# Patient Record
Sex: FEMALE | Race: OTHER | Employment: PART TIME | URBAN - METROPOLITAN AREA
[De-identification: names, ages, dates, MRNs, and addresses within clinical notes are randomized per-mention and may not be internally consistent; named-entity substitution may affect disease eponyms.]

---

## 2019-04-12 ENCOUNTER — HOSPITAL ENCOUNTER (EMERGENCY)
Age: 29
Discharge: HOME | End: 2019-04-12
Attending: EMERGENCY MEDICINE
Payer: SELF-PAY

## 2019-04-12 ENCOUNTER — APPOINTMENT (OUTPATIENT)
Dept: GENERAL RADIOLOGY | Age: 29
End: 2019-04-12
Attending: EMERGENCY MEDICINE
Payer: SELF-PAY

## 2019-04-12 VITALS
OXYGEN SATURATION: 97 % | TEMPERATURE: 98.4 F | HEART RATE: 64 BPM | DIASTOLIC BLOOD PRESSURE: 72 MMHG | SYSTOLIC BLOOD PRESSURE: 118 MMHG | RESPIRATION RATE: 18 BRPM

## 2019-04-12 DIAGNOSIS — M94.0 COSTOCHONDRITIS: Primary | ICD-10-CM

## 2019-04-12 LAB
ANION GAP SERPL CALC-SCNC: 9 MMOL/L
ATRIAL RATE: 65 BPM
BASOPHILS # BLD: 0.1 K/UL (ref 0–0.2)
BASOPHILS NFR BLD: 1 % (ref 0–2)
BUN SERPL-MCNC: 7 MG/DL (ref 6–23)
CALCIUM SERPL-MCNC: 9.2 MG/DL (ref 8.3–10.4)
CALCULATED P AXIS, ECG09: 54 DEGREES
CALCULATED R AXIS, ECG10: 37 DEGREES
CALCULATED T AXIS, ECG11: 17 DEGREES
CHLORIDE SERPL-SCNC: 106 MMOL/L (ref 98–107)
CO2 SERPL-SCNC: 25 MMOL/L (ref 21–32)
CREAT SERPL-MCNC: 0.6 MG/DL (ref 0.6–1)
CRP SERPL-MCNC: 1.9 MG/DL (ref 0–0.9)
D DIMER PPP FEU-MCNC: 0.7 UG/ML(FEU)
DIAGNOSIS, 93000: NORMAL
DIFFERENTIAL METHOD BLD: NORMAL
EOSINOPHIL # BLD: 0.2 K/UL (ref 0–0.8)
EOSINOPHIL NFR BLD: 2 % (ref 0.5–7.8)
ERYTHROCYTE [DISTWIDTH] IN BLOOD BY AUTOMATED COUNT: 13.1 % (ref 11.9–14.6)
GLUCOSE SERPL-MCNC: 84 MG/DL (ref 65–100)
HCG UR QL: NEGATIVE
HCT VFR BLD AUTO: 40 % (ref 35.8–46.3)
HGB BLD-MCNC: 13.2 G/DL (ref 11.7–15.4)
IMM GRANULOCYTES # BLD AUTO: 0 K/UL (ref 0–0.5)
IMM GRANULOCYTES NFR BLD AUTO: 0 % (ref 0–5)
LYMPHOCYTES # BLD: 3.1 K/UL (ref 0.5–4.6)
LYMPHOCYTES NFR BLD: 35 % (ref 13–44)
MCH RBC QN AUTO: 29.8 PG (ref 26.1–32.9)
MCHC RBC AUTO-ENTMCNC: 33 G/DL (ref 31.4–35)
MCV RBC AUTO: 90.3 FL (ref 79.6–97.8)
MONOCYTES # BLD: 0.5 K/UL (ref 0.1–1.3)
MONOCYTES NFR BLD: 6 % (ref 4–12)
NEUTS SEG # BLD: 5 K/UL (ref 1.7–8.2)
NEUTS SEG NFR BLD: 56 % (ref 43–78)
NRBC # BLD: 0 K/UL (ref 0–0.2)
P-R INTERVAL, ECG05: 124 MS
PLATELET # BLD AUTO: 317 K/UL (ref 150–450)
PMV BLD AUTO: 10 FL (ref 9.4–12.3)
POTASSIUM SERPL-SCNC: 4 MMOL/L (ref 3.5–5.1)
Q-T INTERVAL, ECG07: 382 MS
QRS DURATION, ECG06: 76 MS
QTC CALCULATION (BEZET), ECG08: 397 MS
RBC # BLD AUTO: 4.43 M/UL (ref 4.05–5.2)
SODIUM SERPL-SCNC: 140 MMOL/L (ref 136–145)
VENTRICULAR RATE, ECG03: 65 BPM
WBC # BLD AUTO: 9 K/UL (ref 4.3–11.1)

## 2019-04-12 PROCEDURE — 86140 C-REACTIVE PROTEIN: CPT

## 2019-04-12 PROCEDURE — 99284 EMERGENCY DEPT VISIT MOD MDM: CPT | Performed by: EMERGENCY MEDICINE

## 2019-04-12 PROCEDURE — 71046 X-RAY EXAM CHEST 2 VIEWS: CPT

## 2019-04-12 PROCEDURE — 81025 URINE PREGNANCY TEST: CPT

## 2019-04-12 PROCEDURE — 93005 ELECTROCARDIOGRAM TRACING: CPT | Performed by: EMERGENCY MEDICINE

## 2019-04-12 PROCEDURE — 80048 BASIC METABOLIC PNL TOTAL CA: CPT

## 2019-04-12 PROCEDURE — 85379 FIBRIN DEGRADATION QUANT: CPT

## 2019-04-12 PROCEDURE — 85025 COMPLETE CBC W/AUTO DIFF WBC: CPT

## 2019-04-12 RX ORDER — PREDNISONE 10 MG/1
TABLET ORAL
Qty: 21 TAB | Refills: 0 | Status: SHIPPED | OUTPATIENT
Start: 2019-04-12 | End: 2019-09-02 | Stop reason: CLARIF

## 2019-04-12 NOTE — DISCHARGE INSTRUCTIONS
Patient Education        Costocondritis: Jennifer Nunez - [ Costochondritis: Care Instructions ]  Instrucciones de cuidado  Usted tiene dolor en el pecho porque el cartílago de lan caja torácica está inflamado. Yulisa problema se llama costocondritis. Yulisa tipo de dolor de la pared torácica puede durar desde varios días a semanas. No es un problema cardíaco. A veces la costocondritis ocurre con un resfriado o la gripe, y otras veces no se conoce la causa exacta. La atención de seguimiento es kaykay parte clave de lan tratamiento y seguridad. Asegúrese de hacer y acudir a todas las citas, y llame a lan médico si está teniendo problemas. También es kaykay buena idea saber los resultados de seferino exámenes y mantener kaykay lista de los medicamentos que isidoro. ¿Cómo puede cuidarse en el hogar? · [employer] medicamentos para el dolor y la inflamación exactamente wu le fueron indicados. ? Si el médico le recetó un medicamento, tómelo según las indicaciones. ? Si no está tomando un analgésico (medicamento para el dolor) recetado, pregúntele a lan médico si puede domo shruthi de [employer]. ? No tome dos o más analgésicos al [employer], a menos que el médico se lo haya indicado. Muchos analgésicos contienen acetaminofén, es decir, Tylenol. El exceso de acetaminofén (Tylenol) puede ser dañino. · Usar kaykay compresa tibia o kaykay almohadilla térmica (a temperatura baja) sobre el pecho puede ayudar. También puede tratar de [employer] calor y hielo. Colóquese hielo o kaykay compresa fría en la doug kinsey 10 a 20 minutos cada vez. Póngase un paño malone entre el hielo y la piel. · Evite cualquier actividad en la que tenga que esforzar la doug del pecho. A medida que lan dolor mejore, puede volver poco a poco a seferino actividades normales. · No use cinta adhesiva, un vendaje elástico, un \"cinturón para las costillas\", ni ninguna otra cosa que restrinja el movimiento de la pared torácica. ¿Cuándo debe pedir ayuda?   Llame al 911 en cualquier momento que considere que necesita atención de emergencia. Por ejemplo, llame si:    · Siente nuevo o diferente dolor u opresión en el pecho. [place] podría ocurrir junto con:  ? Sudoración. ? Falta de aire. ? Náuseas o vómito. ? Dolor que se extiende del pecho al ro, la [place], o hacia shruthi o ambos hombros o [id number] ? Dylan James. ? Pulso rápido o irregular. Después de llamar al 911, mastique 1 aspirina para adultos. Espere a la ambulancia. No trate de conducir usted mismo un automóvil.     · Tiene serias dificultades para respirar.    Llame a lan médico ahora mismo o busque atención médica inmediata si:    · Tiene fiebre o tos.     · Tiene cualquier dificultad para respirar.     · El dolor en el pecho empeora.    Preste especial atención a los cambios en lan lalita y asegúrese de comunicarse con lan médico si:    · El dolor de pecho continúa aunque esté tomando medicamentos antiinflamatorios.     · El dolor de la pared torácica no de pb después de 5 a 7 días. ¿Dónde puede encontrar más información en inglés? Elbert Ryder a http://efren-isaac.info/. Azul Rabago N049 en la búsqueda para aprender más acerca de \"Costocondritis: Instrucciones de cuidado - [ Costochondritis: Care Instructions ]. \"  Revisado: 23 septiembre, 2018  Versión del contenido: 11.9  © 9314-5984 [employer]. Las instrucciones de cuidado fueron adaptadas bajo licencia por [employer] (which disclaims liability or warranty for this information). Si usted tiene M[place] Huger afección médica o sobre estas instrucciones, siempre pregunte a lan profesional de lalita. [facility]orporated niega toda garantía o responsabilidad por lan uso de esta información.

## 2019-04-12 NOTE — PROGRESS NOTES
present when Dr. Yaquelin Bernal spoke to the patient about her diagnostic. Thank you, Matthew Odell [facility] [address]  Gaurav Cohen 
[phone] [email or web address]

## 2019-04-12 NOTE — ED NOTES
Using phone  patient complaining of shortness of breath and chest pain starting yesterday. Patient advises that shortness of breath is worse when she lays down that she had to keep sitting up. Patient advises no medical history and NKDA. Will move patient from triage to room and start EKG.

## 2019-04-12 NOTE — PROGRESS NOTES
present for nurse assessment and doctor's assessment with Dr. Shannan Yarbrough. Thank you, Hebert Kawasaki [facility] [address]  Gaurav Cohen 
[phone] [email or web address]

## 2019-04-12 NOTE — ED PROVIDER NOTES
Patient presents with complaints of chest pain that began yesterday. Pain is present only with deep inspiration. She also reports shortness of breath that seems to be worse or present only when laying supine. She denies any fever or chills or cough or congestion. There is no radiation of pain which is sternal in location. She has no history of heart or lung disease and is currently on no medications. The history is provided by the patient. Shortness of Breath This is a new problem. The average episode lasts 1 day. The current episode started yesterday. The problem has not changed since onset. Associated symptoms include orthopnea, chest pain and leg swelling. Pertinent negatives include no fever, no headaches, no coryza, no rhinorrhea, no swollen glands, no ear pain, no neck pain, no cough, no sputum production, no hemoptysis, no wheezing, no PND, no syncope, no vomiting, no abdominal pain, no rash, no leg pain and no claudication. It is unknown what precipitated the problem. She has tried nothing for the symptoms. She has had no prior hospitalizations. She has had no prior ED visits. She has had no prior ICU admissions. No past medical history on file. No past surgical history on file. No family history on file. Social History Socioeconomic History  Marital status: SINGLE Spouse name: Not on file  Number of children: Not on file  Years of education: Not on file  Highest education level: Not on file Occupational History  Not on file Social Needs  Financial resource strain: Not on file  Food insecurity:  
  Worry: Not on file Inability: Not on file  Transportation needs:  
  Medical: Not on file Non-medical: Not on file Tobacco Use  Smoking status: Not on file Substance and Sexual Activity  Alcohol use: Not on file  Drug use: Not on file  Sexual activity: Not on file Lifestyle  Physical activity: Days per week: Not on file Minutes per session: Not on file  Stress: Not on file Relationships  Social connections:  
  Talks on phone: Not on file Gets together: Not on file Attends Faith service: Not on file Active member of club or organization: Not on file Attends meetings of clubs or organizations: Not on file Relationship status: Not on file  Intimate partner violence:  
  Fear of current or ex partner: Not on file Emotionally abused: Not on file Physically abused: Not on file Forced sexual activity: Not on file Other Topics Concern  Not on file Social History Narrative  Not on file ALLERGIES: Patient has no known allergies. Review of Systems Constitutional: Negative for fever. HENT: Negative for ear pain and rhinorrhea. Respiratory: Positive for shortness of breath. Negative for cough, hemoptysis, sputum production and wheezing. Cardiovascular: Positive for chest pain, orthopnea and leg swelling. Negative for claudication, syncope and PND. Gastrointestinal: Negative for abdominal pain and vomiting. Musculoskeletal: Negative for neck pain. Skin: Negative for rash. Neurological: Negative for headaches. All other systems reviewed and are negative. Vitals:  
 04/12/19 7415 BP: 133/84 Pulse: 67 Resp: 18 Temp: 98.4 °F (36.9 °C) SpO2: 100% Physical Exam  
Constitutional: She appears well-developed and well-nourished. No distress. HENT:  
Head: Normocephalic and atraumatic. Eyes: Pupils are equal, round, and reactive to light. Conjunctivae and EOM are normal.  
Neck: Normal range of motion. Neck supple. Cardiovascular: Normal rate and regular rhythm. Pulmonary/Chest: Effort normal and breath sounds normal. She exhibits tenderness. Abdominal: Soft. Bowel sounds are normal.  
Musculoskeletal: Normal range of motion. She exhibits no edema, tenderness or deformity. Skin: Skin is warm and dry. Capillary refill takes less than 2 seconds. She is not diaphoretic. Psychiatric: She has a normal mood and affect. Her behavior is normal.  
Nursing note and vitals reviewed. MDM Number of Diagnoses or Management Options Amount and/or Complexity of Data Reviewed Clinical lab tests: ordered and reviewed Tests in the radiology section of CPT®: ordered and reviewed Independent visualization of images, tracings, or specimens: yes (EKG at 0 8:15: Is normal sinus rhythm rate of 65 no acute ischemic changes) Risk of Complications, Morbidity, and/or Mortality Presenting problems: moderate Diagnostic procedures: moderate Management options: moderate Patient Progress Patient progress: stable Procedures

## 2019-04-12 NOTE — ED NOTES
I have reviewed discharge instructions with the patient. The patient verbalized understanding. Patient left ED via Discharge Method: ambulatory to Home with her spouse at bedside. Opportunity for questions and clarification provided. Patient given 1 scripts. To continue your aftercare when you leave the hospital, you may receive an automated call from our care team to check in on how you are doing. This is a free service and part of our promise to provide the best care and service to meet your aftercare needs.  If you have questions, or wish to unsubscribe from this service please call [phone]. Thank you for Choosing our [facility] Emergency Department.

## 2019-04-12 NOTE — PROGRESS NOTES
present for discharge instructions with Yajaira Thank you, Claudette Siren [facility] [address]  Gaurav Cohen 
[phone] [email or web address]

## 2019-09-02 ENCOUNTER — HOSPITAL ENCOUNTER (EMERGENCY)
Age: 29
Discharge: HOME | End: 2019-09-02
Attending: EMERGENCY MEDICINE
Payer: SELF-PAY

## 2019-09-02 VITALS
HEART RATE: 64 BPM | RESPIRATION RATE: 16 BRPM | HEIGHT: 60 IN | TEMPERATURE: 98.9 F | WEIGHT: 140 LBS | SYSTOLIC BLOOD PRESSURE: 138 MMHG | DIASTOLIC BLOOD PRESSURE: 86 MMHG | OXYGEN SATURATION: 100 % | BODY MASS INDEX: 27.48 KG/M2

## 2019-09-02 DIAGNOSIS — R10.32 ABDOMINAL PAIN, LLQ (LEFT LOWER QUADRANT): Primary | ICD-10-CM

## 2019-09-02 DIAGNOSIS — N76.0 BV (BACTERIAL VAGINOSIS): ICD-10-CM

## 2019-09-02 DIAGNOSIS — B96.89 BV (BACTERIAL VAGINOSIS): ICD-10-CM

## 2019-09-02 LAB
ALBUMIN SERPL-MCNC: 3.8 G/DL (ref 3.5–5)
ALBUMIN/GLOB SERPL: 0.9 {RATIO} (ref 1.2–3.5)
ALP SERPL-CCNC: 65 U/L (ref 50–130)
ALT SERPL-CCNC: 77 U/L (ref 12–65)
ANION GAP SERPL CALC-SCNC: 7 MMOL/L (ref 7–16)
AST SERPL-CCNC: 43 U/L (ref 15–37)
BASOPHILS # BLD: 0 K/UL (ref 0–0.2)
BASOPHILS NFR BLD: 1 % (ref 0–2)
BILIRUB SERPL-MCNC: 0.3 MG/DL (ref 0.2–1.1)
BUN SERPL-MCNC: 7 MG/DL (ref 6–23)
CALCIUM SERPL-MCNC: 8.8 MG/DL (ref 8.3–10.4)
CHLORIDE SERPL-SCNC: 108 MMOL/L (ref 98–107)
CO2 SERPL-SCNC: 25 MMOL/L (ref 21–32)
CREAT SERPL-MCNC: 0.52 MG/DL (ref 0.6–1)
DIFFERENTIAL METHOD BLD: NORMAL
EOSINOPHIL # BLD: 0.2 K/UL (ref 0–0.8)
EOSINOPHIL NFR BLD: 3 % (ref 0.5–7.8)
ERYTHROCYTE [DISTWIDTH] IN BLOOD BY AUTOMATED COUNT: 13.1 % (ref 11.9–14.6)
GLOBULIN SER CALC-MCNC: 4.1 G/DL (ref 2.3–3.5)
GLUCOSE SERPL-MCNC: 77 MG/DL (ref 65–100)
HCG SERPL-ACNC: <1 MIU/ML (ref 0–6)
HCG UR QL: NEGATIVE
HCT VFR BLD AUTO: 39.2 % (ref 35.8–46.3)
HGB BLD-MCNC: 13.2 G/DL (ref 11.7–15.4)
IMM GRANULOCYTES # BLD AUTO: 0 K/UL (ref 0–0.5)
IMM GRANULOCYTES NFR BLD AUTO: 0 % (ref 0–5)
LYMPHOCYTES # BLD: 2.8 K/UL (ref 0.5–4.6)
LYMPHOCYTES NFR BLD: 37 % (ref 13–44)
MCH RBC QN AUTO: 30.1 PG (ref 26.1–32.9)
MCHC RBC AUTO-ENTMCNC: 33.7 G/DL (ref 31.4–35)
MCV RBC AUTO: 89.5 FL (ref 79.6–97.8)
MONOCYTES # BLD: 0.5 K/UL (ref 0.1–1.3)
MONOCYTES NFR BLD: 7 % (ref 4–12)
NEUTS SEG # BLD: 3.9 K/UL (ref 1.7–8.2)
NEUTS SEG NFR BLD: 53 % (ref 43–78)
NRBC # BLD: 0 K/UL (ref 0–0.2)
PLATELET # BLD AUTO: 288 K/UL (ref 150–450)
PMV BLD AUTO: 10.2 FL (ref 9.4–12.3)
POTASSIUM SERPL-SCNC: 3.6 MMOL/L (ref 3.5–5.1)
PROT SERPL-MCNC: 7.9 G/DL (ref 6.3–8.2)
RBC # BLD AUTO: 4.38 M/UL (ref 4.05–5.2)
SERVICE CMNT-IMP: NORMAL
SODIUM SERPL-SCNC: 140 MMOL/L (ref 136–145)
WBC # BLD AUTO: 7.5 K/UL (ref 4.3–11.1)
WET PREP GENITAL: NORMAL

## 2019-09-02 PROCEDURE — 81003 URINALYSIS AUTO W/O SCOPE: CPT | Performed by: EMERGENCY MEDICINE

## 2019-09-02 PROCEDURE — 99284 EMERGENCY DEPT VISIT MOD MDM: CPT | Performed by: EMERGENCY MEDICINE

## 2019-09-02 PROCEDURE — 80053 COMPREHEN METABOLIC PANEL: CPT

## 2019-09-02 PROCEDURE — 74011250637 HC RX REV CODE- 250/637: Performed by: EMERGENCY MEDICINE

## 2019-09-02 PROCEDURE — 84702 CHORIONIC GONADOTROPIN TEST: CPT

## 2019-09-02 PROCEDURE — 85025 COMPLETE CBC W/AUTO DIFF WBC: CPT

## 2019-09-02 PROCEDURE — 87210 SMEAR WET MOUNT SALINE/INK: CPT

## 2019-09-02 PROCEDURE — 81025 URINE PREGNANCY TEST: CPT

## 2019-09-02 PROCEDURE — 87491 CHLMYD TRACH DNA AMP PROBE: CPT

## 2019-09-02 RX ORDER — IBUPROFEN 800 MG/1
800 TABLET ORAL
Status: COMPLETED | OUTPATIENT
Start: 2019-09-02 | End: 2019-09-02

## 2019-09-02 RX ORDER — NAPROXEN SODIUM 550 MG/1
550 TABLET ORAL 2 TIMES DAILY WITH MEALS
Qty: 10 TAB | Refills: 0 | Status: SHIPPED | OUTPATIENT
Start: 2019-09-02

## 2019-09-02 RX ORDER — METRONIDAZOLE 500 MG/1
500 TABLET ORAL 2 TIMES DAILY
Qty: 14 TAB | Refills: 0 | Status: SHIPPED | OUTPATIENT
Start: 2019-09-02

## 2019-09-02 RX ADMIN — IBUPROFEN 800 MG: 800 TABLET, FILM COATED ORAL at 12:12

## 2019-09-02 NOTE — DISCHARGE INSTRUCTIONS
Medications as directed. Important to have primary care doctor to follow you along. Recheck sooner for high fever worse bleeding. Medication will usually help start menstrual periods back within a few days to a week. Patient Education        Dolor abdominal: Instrucciones de cuidado - [ Abdominal Pain: Care Instructions ]  Instrucciones de cuidado    El dolor abdominal tiene muchas causas posibles. Algunas de ellas no son graves y mejoran por sí solas en unos días. Otras requieren Keisha [place] y [place]. Si lan dolor continúa o [place], necesitará kaykay nueva revisión y [place] pruebas para determinar qué pasa. Es posible que necesite cirugía para corregir el problema. No ignore nuevos síntomas, wu fiebre, náuseas y [place], [facility], dolor que [place] o [place]. Podrían ser señales de un problema más grave. Lan médico puede haberle recomendado kaykay consulta de Danny Rosan las 8 o 12 horas siguientes. Si no se siente mejor, es posible que requiera Keisha [place] o [place]. El médico lo de revisado minuciosamente, ana puede susan problemas más tarde. Si nota algún problema o síntomas nuevos, busque tratamiento médico inmediatamente. La atención de seguimiento es kaykay parte clave de lan tratamiento y seguridad. Asegúrese de hacer y acudir a todas las citas, y llame a lan médico si está teniendo problemas. También es kaykay buena idea saber los resultados de seferino exámenes y mantener kaykay lista de los medicamentos que isidoro. ¿Cómo puede cuidarse en el hogar? · Descanse hasta que se sienta mejor. · Para prevenir la deshidratación, lisa abundantes líquidos, suficientes para que lan orina sea de color amarillo patricia o transparente wu el agua. Elija beber agua y otros líquidos hair sin cafeína hasta que se sienta mejor. Si tiene [facility] [employer], del corazón o del hígado y tiene que [place] líquidos, hable con lan médico antes de aumentar lan consumo.   · Si tiene [employer] estomacal, coma alimentos suaves, wu arroz, pan yomaira seco o galletas saladas, bananas (plátanos) y puré de [place]. Trate de comer varias comidas pequeñas al día en lugar de dos o tessie grandes. · Espere hasta 48 horas después de que todos los síntomas hayan desaparecido antes de comer alimentos condimentados, alcohol y bebidas que contengan cafeína. · No consuma alimentos ricos en grasa. · Evite medicamentos antiinflamatorios wu aspirina, ibuprofeno (Advil, Motrin) y naproxeno (Aleve). Pueden causar [employer]. Dígale a lan médico si está tomando aspirina diariamente debido a otro problema de lalita. ¿Cuándo debe pedir ayuda? Llame al 911 en cualquier momento que considere que necesita atención de emergencia. Por ejemplo, llame si:    · Se desmayó (perdió el conocimiento).   · Las heces son de color rojizo o muy sanguinolentas (con anatoliy).   · Vomita anatoliy o algo parecido a granos de café molido.     · Tiene dolor abdominal nuevo e intenso.    Llame a lan médico ahora mismo o busque atención médica inmediata si:    · Lan dolor empeora, sobre todo si se concentra en kaykay courtney parte del vientre.     · Vuelve a tener fiebre o tiene fiebre más juan.     · Ale heces son negruzcas y parecidas al alquitrán o tienen rastros de anatoliy.     · Tiene sangrado vaginal inesperado.     · Tiene síntomas de kaykay infección del tracto urinario. Estos podrían incluir:  ? Dolor al [place]ham [place]. ? Orinar con más frecuencia que lo habitual.  ? Anatoliy en la [place].     · Siente mareos o aturdimiento, o que está a punto de desmayarse.    Preste especial atención a los cambios en lan lalita y asegúrese de comunicarse con lan médico si:    · No está mejorando después de 1 día (24 horas). ¿Dónde puede encontrar más información en inglés? Sandra Montoya a http://efren-isaac.info/.   Angie Rodriguez C180 en la búsqueda para aprender más acerca de \"Dolor abdominal: Instrucciones de cuidado - [ Abdominal Pain: Care Instructions ].\"  Revisado: 23 septiembre, 2018  Versión del contenido: 12.1  © 8010-4430 [employer]wise, Incorporated. Las instrucciones de cuidado fueron adaptadas bajo licencia por [employer] (which disclaims liability or warranty for this information). Si usted tiene [place] Olla afección médica o sobre estas instrucciones, siempre pregunte a lan profesional de lalita. Healthwise, Incorporated niega toda garantía o responsabilidad por lan uso de esta información. Patient Education        Vaginosis bacteriana: Instrucciones de cuidado - [ Bacterial Vaginosis: Care Instructions ]  Instrucciones de cuidado    La vaginosis bacteriana es un tipo de infección vaginal. Es causada por el crecimiento excesivo de ciertas bacterias que se encuentran normalmente en la vagina. Entre los síntomas se incluyen comezón, hinchazón, dolor al orinar o tener relaciones sexuales, y flujo grisáceo o amarillento con olor a pescado. No se considera kaykay infección que se transmita por contacto sexual.  Aunque los síntomas pueden ser molestos e incómodos, la vaginosis bacteriana no suele causar otros problemas de [facility]. Sin embargo, puede causar complicaciones si la tiene mientras está [place]. Si sreedhar la infección podría desaparecer por sí courtney, la mayoría de los médicos utilizan antibióticos para lan tratamiento. Es posible que le hayan recetado pastillas o cremas vaginales. Con tratamiento, la vaginosis bacteriana suele desaparecer al cabo de 5 a 7 días. La atención de seguimiento es kaykay parte clave de lan tratamiento y seguridad. Asegúrese de hacer y acudir a todas las citas, y llame a lan médico si está teniendo problemas. También es kaykay buena idea saber los resultados de seferino exámenes y mantener kaykay lista de los medicamentos que isidoro. ¿Cómo puede cuidarse en el hogar? · [address] No deje de tomarlos por el hecho de sentirse mejor. Debe domo todos los antibióticos hasta terminarlos.   · Si está tomando metronidazol (Flagyl), no coma ni lisa nada que contenga alcohol. · Siga utilizando los medicamentos aunque comience el período menstrual. Utilice toallas sanitarias en lugar de tampones kinsey el tiempo que utilice la crema vaginal o supositorios. Los tampones pueden absorber el medicamento. · Use ropa holgada de algodón. No utilice nylon ni otros materiales que conserven el calor corporal y la humedad cerca de la piel. · No se rasque. Alivie la comezón con kaykay compresa fría o un baño frío. · No se lave la doug vaginal más de kaykay vez al día. Use solo agua corriente o un Nicole Sera y sin perfume. No use lavados vaginales. ¿Cuándo debe pedir ayuda? Preste especial atención a los cambios en lan lalita y asegúrese de comunicarse con lan médico si:    · Tiene sangrado vaginal inesperado.     · Tiene fiebre.     · Tiene mayor dolor o dolor nuevo en la vagina o la pelvis.     · No mejora después de 1 semana.     · Ale síntomas regresan después de que termina ale medicamentos. ¿Dónde puede encontrar más información en inglés? Chadwick Olson a http://efren-isaac.info/. Dwaine Thrasher N791 en la búsqueda para aprender más acerca de \"Vaginosis bacteriana: Instrucciones de cuidado - [ Bacterial Vaginosis: Care Instructions ]. \"  Revisado: 19 febrero, 2019  Versión del contenido: 12.1  © 7297-5054 Healthwise, Incorporated. Las instrucciones de cuidado fueron adaptadas bajo licencia por [employer] (which disclaims liability or warranty for this information). Si usted tiene [place] afección médica o sobre estas instrucciones, siempre pregunte a lan profesional de lalita. Healthwise, Incorporated niega toda garantía o responsabilidad por lan uso de esta información.

## 2019-09-02 NOTE — ED PROVIDER NOTES
79-year-old female with lower abdominal pain over the last 24 hours described as cramping in the middle aspect of her lower abdomen. No nausea vomiting diarrhea. No fever or back pain. No dysuria. Some vaginal discharge but no spotting. Last menstrual period 3 months ago. No surgery in the past.    The history is provided by the patient. A  was used. Abdominal Pain    This is a new problem. The current episode started yesterday. The problem occurs constantly. The problem has not changed since onset. The pain is located in the suprapubic region. The quality of the pain is dull and cramping. The pain is moderate. Pertinent negatives include no fever, no diarrhea, no hematochezia, no melena, no nausea, no vomiting, no constipation, no dysuria, no frequency, no chest pain and no back pain. Nothing worsens the pain. The pain is relieved by nothing. The patient's surgical history non-contributory. History reviewed. No pertinent past medical history. History reviewed. No pertinent surgical history. History reviewed. No pertinent family history.     Social History     Socioeconomic History    Marital status: SINGLE     Spouse name: Not on file    Number of children: Not on file    Years of education: Not on file    Highest education level: Not on file   Occupational History    Not on file   Social Needs    Financial resource strain: Not on file    Food insecurity:     Worry: Not on file     Inability: Not on file    Transportation needs:     Medical: Not on file     Non-medical: Not on file   Tobacco Use    Smoking status: Never Smoker   Substance and Sexual Activity    Alcohol use: Not Currently    Drug use: Not Currently    Sexual activity: Not on file   Lifestyle    Physical activity:     Days per week: Not on file     Minutes per session: Not on file    Stress: Not on file   Relationships    Social connections:     Talks on phone: Not on file     Gets together: Not on file     Attends Synagogue service: Not on file     Active member of club or organization: Not on file     Attends meetings of clubs or organizations: Not on file     Relationship status: Not on file    Intimate partner violence:     Fear of current or ex partner: Not on file     Emotionally abused: Not on file     Physically abused: Not on file     Forced sexual activity: Not on file   Other Topics Concern    Not on file   Social History Narrative    Not on file         ALLERGIES: Patient has no known allergies. Review of Systems   Constitutional: Negative for chills and fever. Respiratory: Negative for cough. Cardiovascular: Negative for chest pain. Gastrointestinal: Positive for abdominal pain. Negative for constipation, diarrhea, hematochezia, melena, nausea and vomiting. Genitourinary: Negative for dysuria and frequency. Musculoskeletal: Negative for back pain. Vitals:    09/02/19 1030   BP: 138/70   Pulse: 77   Resp: 16   Temp: 98.9 °F (37.2 °C)   SpO2: 99%   Weight: 63.5 kg (140 lb)   Height: 5' (1.524 m)            Physical Exam   Constitutional: She is oriented to person, place, and time. She appears well-developed and well-nourished. No distress. HENT:   Head: Normocephalic and atraumatic. Right Ear: External ear normal.   Left Ear: External ear normal.   Mouth/Throat: Oropharynx is clear and moist. No oropharyngeal exudate. Eyes: Pupils are equal, round, and reactive to light. Conjunctivae and EOM are normal.   Neck: Normal range of motion. Neck supple. Cardiovascular: Normal rate, regular rhythm and intact distal pulses. No murmur heard. Pulmonary/Chest: Breath sounds normal. No respiratory distress. Abdominal: Soft. Bowel sounds are normal. She exhibits no mass. There is no tenderness. There is no rebound, no guarding and no tenderness at McBurney's point. No hernia. Genitourinary: Uterus is tender. Right adnexum displays no tenderness.  Left adnexum displays no tenderness. No bleeding in the vagina. No foreign body in the vagina. Vaginal discharge (Minimal) found. Neurological: She is alert and oriented to person, place, and time. Gait normal.   Nl speech   Skin: Skin is warm and dry. Psychiatric: She has a normal mood and affect. Her speech is normal.   Nursing note and vitals reviewed. MDM  Number of Diagnoses or Management Options  Diagnosis management comments: Check for pregnancy. Check for UTI. Pelvic examination. Amount and/or Complexity of Data Reviewed  Clinical lab tests: ordered and reviewed  Review and summarize past medical records: yes (Seen for right lower quadrant pain 8 weeks ago. Had negative CT scan. Also had negative ultrasound of liver, gallbladder, uterus and adnexa)    Risk of Complications, Morbidity, and/or Mortality  Presenting problems: moderate  Diagnostic procedures: minimal  Management options: low    Patient Progress  Patient progress: stable         Procedures    10:44 AM  Patient states pain over the last 24 hours is different from the pain she presented with at another hospital 8 weeks ago. Results Include:    Recent Results (from the past 24 hour(s))   HCG URINE, QL. - POC    Collection Time: 09/02/19 10:42 AM   Result Value Ref Range    Pregnancy test,urine (POC) NEGATIVE  NEG     CBC WITH AUTOMATED DIFF    Collection Time: 09/02/19 10:49 AM   Result Value Ref Range    WBC 7.5 4.3 - 11.1 K/uL    RBC 4.38 4.05 - 5.2 M/uL    HGB 13.2 11.7 - 15.4 g/dL    HCT 39.2 35.8 - 46.3 %    MCV 89.5 79.6 - 97.8 FL    MCH 30.1 26.1 - 32.9 PG    MCHC 33.7 31.4 - 35.0 g/dL    RDW 13.1 11.9 - 14.6 %    PLATELET 706 519 - 332 K/uL    MPV 10.2 9.4 - 12.3 FL    ABSOLUTE NRBC 0.00 0.0 - 0.2 K/uL    DF AUTOMATED      NEUTROPHILS 53 43 - 78 %    LYMPHOCYTES 37 13 - 44 %    MONOCYTES 7 4.0 - 12.0 %    EOSINOPHILS 3 0.5 - 7.8 %    BASOPHILS 1 0.0 - 2.0 %    IMMATURE GRANULOCYTES 0 0.0 - 5.0 %    ABS. NEUTROPHILS 3.9 1.7 - 8.2 K/UL    ABS. LYMPHOCYTES 2.8 0.5 - 4.6 K/UL    ABS. MONOCYTES 0.5 0.1 - 1.3 K/UL    ABS. EOSINOPHILS 0.2 0.0 - 0.8 K/UL    ABS. BASOPHILS 0.0 0.0 - 0.2 K/UL    ABS. IMM. GRANS. 0.0 0.0 - 0.5 K/UL   METABOLIC PANEL, COMPREHENSIVE    Collection Time: 09/02/19 10:49 AM   Result Value Ref Range    Sodium 140 136 - 145 mmol/L    Potassium 3.6 3.5 - 5.1 mmol/L    Chloride 108 (H) 98 - 107 mmol/L    CO2 25 21 - 32 mmol/L    Anion gap 7 7 - 16 mmol/L    Glucose 77 65 - 100 mg/dL    BUN 7 6 - 23 MG/DL    Creatinine 0.52 (L) 0.6 - 1.0 MG/DL    GFR est AA >60 >60 ml/min/1.73m2    GFR est non-AA >60 >60 ml/min/1.73m2    Calcium 8.8 8.3 - 10.4 MG/DL    Bilirubin, total 0.3 0.2 - 1.1 MG/DL    ALT (SGPT) 77 (H) 12 - 65 U/L    AST (SGOT) 43 (H) 15 - 37 U/L    Alk. phosphatase 65 50 - 130 U/L    Protein, total 7.9 6.3 - 8.2 g/dL    Albumin 3.8 3.5 - 5.0 g/dL    Globulin 4.1 (H) 2.3 - 3.5 g/dL    A-G Ratio 0.9 (L) 1.2 - 3.5     BETA HCG, QT    Collection Time: 09/02/19 10:49 AM   Result Value Ref Range    Beta HCG, QT <1 0.0 - 6.0 MIU/ML   WET PREP    Collection Time: 09/02/19 11:26 AM   Result Value Ref Range    Special Requests: NO SPECIAL REQUESTS      Wet prep 5 TO 10 WBCS/HPF     Wet prep RARE  CLUE CELLS PRESENT        Wet prep NO YEAST SEEN      Wet prep NO TRICHOMONAS SEEN           No evidence for any surgical abdomen. Will treat for BV.   Suspect uterine pain due to impending dysmenorrhea

## 2019-09-02 NOTE — ROUTINE PROCESS
I have reviewed discharge instructions with the patient. The patient verbalized understanding. Patient left ED via Discharge Method: ambulatory to Home with friend. Opportunity for questions and clarification provided. Patient given 2 scripts. To continue your aftercare when you leave the hospital, you may receive an automated call from our care team to check in on how you are doing. This is a free service and part of our promise to provide the best care and service to meet your aftercare needs.  If you have questions, or wish to unsubscribe from this service please call [phone]. Thank you for Choosing our [facility] Emergency Department.

## 2019-09-02 NOTE — PROGRESS NOTES
Visited with patient with assistance of SF interpretor Johanna. Demographics verified and patient informed that I would have Blaine Harden   bilingual community RN call her and assist with PCP follow up. Patient in agreement.

## 2019-09-02 NOTE — ED TRIAGE NOTES
Pt states lower abd pain and states she has not had a period in three months. States she has taken pregnancy test at home but they have been negative. States she has been more tired than normal but denies N/V/D.

## 2019-09-05 LAB
C TRACH RRNA SPEC QL NAA+PROBE: NEGATIVE
N GONORRHOEA RRNA SPEC QL NAA+PROBE: NEGATIVE
SPECIMEN SOURCE: NORMAL